# Patient Record
Sex: MALE
[De-identification: names, ages, dates, MRNs, and addresses within clinical notes are randomized per-mention and may not be internally consistent; named-entity substitution may affect disease eponyms.]

---

## 2021-08-17 PROBLEM — Z00.00 ENCOUNTER FOR PREVENTIVE HEALTH EXAMINATION: Status: ACTIVE | Noted: 2021-08-17

## 2021-08-20 ENCOUNTER — NON-APPOINTMENT (OUTPATIENT)
Age: 66
End: 2021-08-20

## 2021-08-20 ENCOUNTER — APPOINTMENT (OUTPATIENT)
Dept: OTOLARYNGOLOGY | Facility: CLINIC | Age: 66
End: 2021-08-20
Payer: COMMERCIAL

## 2021-08-20 DIAGNOSIS — J31.0 CHRONIC RHINITIS: ICD-10-CM

## 2021-08-20 DIAGNOSIS — J34.2 DEVIATED NASAL SEPTUM: ICD-10-CM

## 2021-08-20 DIAGNOSIS — Z85.79 PERSONAL HISTORY OF OTHER MALIGNANT NEOPLASMS OF LYMPHOID, HEMATOPOIETIC AND RELATED TISSUES: ICD-10-CM

## 2021-08-20 DIAGNOSIS — Z82.49 FAMILY HISTORY OF ISCHEMIC HEART DISEASE AND OTHER DISEASES OF THE CIRCULATORY SYSTEM: ICD-10-CM

## 2021-08-20 DIAGNOSIS — Z87.891 PERSONAL HISTORY OF NICOTINE DEPENDENCE: ICD-10-CM

## 2021-08-20 DIAGNOSIS — R04.0 EPISTAXIS: ICD-10-CM

## 2021-08-20 DIAGNOSIS — Z83.3 FAMILY HISTORY OF DIABETES MELLITUS: ICD-10-CM

## 2021-08-20 DIAGNOSIS — K21.9 GASTRO-ESOPHAGEAL REFLUX DISEASE W/OUT ESOPHAGITIS: ICD-10-CM

## 2021-08-20 PROCEDURE — 31231 NASAL ENDOSCOPY DX: CPT

## 2021-08-20 PROCEDURE — 99203 OFFICE O/P NEW LOW 30 MIN: CPT | Mod: 25

## 2021-08-20 RX ORDER — ENOXAPARIN SODIUM 100 MG/ML
40 INJECTION SUBCUTANEOUS
Refills: 0 | Status: ACTIVE | COMMUNITY

## 2021-08-20 RX ORDER — FAMOTIDINE 20 MG/1
20 TABLET, FILM COATED ORAL
Refills: 0 | Status: ACTIVE | COMMUNITY

## 2021-08-20 RX ORDER — BACLOFEN 10 MG/1
10 TABLET ORAL
Refills: 0 | Status: ACTIVE | COMMUNITY

## 2021-08-20 RX ORDER — TENOFOVIR DISOPROXIL FUMARATE 300 MG/1
300 TABLET ORAL
Refills: 0 | Status: ACTIVE | COMMUNITY

## 2021-08-20 RX ORDER — OXYCODONE 5 MG/1
5 TABLET ORAL
Refills: 0 | Status: ACTIVE | COMMUNITY

## 2021-08-20 RX ORDER — ACYCLOVIR 400 MG/1
400 TABLET ORAL
Refills: 0 | Status: ACTIVE | COMMUNITY

## 2021-08-20 NOTE — ASSESSMENT
[FreeTextEntry1] : He has a history of epistaxis. He also suffers from chronic rhinitis and possible allergies. On exam, he had a deviated septum. There was no obvious bleeding source although there may be a prominent vessel on the left anterior nasal septum. There were no clots, scabs, or evidence of recent bleeding. There were no suspicious masses or lesions.\par \par He has history of chronic rhinitis and possible allergies. CT scan of the head performed in April showed a deviated septum but essentially clear sinuses\par \par He has a history of reflux and mild dysphagia. The dysphagia is likely due reflux although to be related to his aneurysm treatment.\par \par Plan\par -Findings and management options were discussed with the patient.\par - Literature for epistaxis management given\par - Patient told to avoid heavy lifting, bending, straining, nose blowing and nasal manipulation\par - if he has bleeding that does not stop, he may need to hold the anticoagulant if possible. \par - Nasal saline spray PRN \par - Moisturizing gel  as needed\par - Patient to go to ER if there is bleeding that cannot be controlled\par - Since there was no active bleeding and I could not confirm the source on the septum, cauterization was not performed. I would like to see him when he has a nose bleed so I can reevaluate the nose.\par -He would like to go for allergy evaluation.\par -He may use allergy medications as needed. He was cautioned that nasal steroid sprays can cause dryness and bleeding.\par - Continue medication for reflux. He was given reflux precautions. Consider further workup with barium swallow for the mild dysphagia.\par - follow up after the allergy evaluation.\par - call and return earlier if any concerns or recurent bleeding. \par

## 2021-08-20 NOTE — CONSULT LETTER
[Dear  ___] : Dear  [unfilled], [Consult Letter:] : I had the pleasure of evaluating your patient, [unfilled]. [Please see my note below.] : Please see my note below. [Consult Closing:] : Thank you very much for allowing me to participate in the care of this patient.  If you have any questions, please do not hesitate to contact me. [Sincerely,] : Sincerely, [FreeTextEntry3] : Emy Chacon MD\par  [DrKera  ___] : Dr. GUILLORY

## 2021-08-20 NOTE — HISTORY OF PRESENT ILLNESS
[de-identified] : NATALEE BELCHER is a 65 year patient Here for evaluation for epistaxis and mild dysphagia.He said that he has intermittent epistaxis from the nose mainly on the left side. The episodes can last about 5-10 minutes and occurs several times throughout the day. He does blow his nose and occasionally manipulate it. He has not had to use pressure to stop the bleeding. He denies a history of nasal surgery or nasal trauma although he has had facial trauma in the past. He has not used any nasal medications although he said he was given a prescription for a spray. He is on anticoagulant. He also had surgery for a brain aneurysm in 2020. He has a history chronic rhinosinusitis. He thinks he may have allergies. he has not had testing.  He also has mild dysphagia. He is on medication for reflux. He was diagnosed with multiple myeloma and is about undergo stem cell transplant. I was able to review his CT scan of the brain from April. The sinuses were clear except for a small area of mucosal thickening in the left frontal sinus. He also had a deviated septum

## 2021-09-13 ENCOUNTER — NON-APPOINTMENT (OUTPATIENT)
Age: 66
End: 2021-09-13

## 2021-09-13 ENCOUNTER — APPOINTMENT (OUTPATIENT)
Dept: OPHTHALMOLOGY | Facility: CLINIC | Age: 66
End: 2021-09-13

## 2021-12-30 ENCOUNTER — TRANSCRIPTION ENCOUNTER (OUTPATIENT)
Age: 66
End: 2021-12-30

## 2022-03-17 ENCOUNTER — TRANSCRIPTION ENCOUNTER (OUTPATIENT)
Age: 67
End: 2022-03-17